# Patient Record
Sex: FEMALE | Race: WHITE | NOT HISPANIC OR LATINO | URBAN - METROPOLITAN AREA
[De-identification: names, ages, dates, MRNs, and addresses within clinical notes are randomized per-mention and may not be internally consistent; named-entity substitution may affect disease eponyms.]

---

## 2017-07-25 ENCOUNTER — DOCUMENTATION (OUTPATIENT)
Dept: URGENT CARE | Facility: PHYSICIAN GROUP | Age: 32
End: 2017-07-25

## 2019-03-05 ENCOUNTER — NON-PROVIDER VISIT (OUTPATIENT)
Dept: URGENT CARE | Facility: PHYSICIAN GROUP | Age: 34
End: 2019-03-05

## 2019-03-05 DIAGNOSIS — Z11.1 ENCOUNTER FOR PPD TEST: Primary | ICD-10-CM

## 2019-03-05 PROCEDURE — 86580 TB INTRADERMAL TEST: CPT | Performed by: PHYSICIAN ASSISTANT

## 2019-03-05 NOTE — PROGRESS NOTES
Svetlana Lunsford is a 33 y.o. female here for a non-provider visit for PPD placement -- Step 1 of 1    Reason for PPD:  work requirement    1. TB evaluation questionnaire completed by patient? Yes      -  If any answers marked yes did you contact a provider prior to placing? Not Indicated  2.  Patient notified to return to clinic for reading on: 3/7/2019  3.  PPD Placement documentation completed on TB evaluation questionnaire? Yes  4.  Location of TB evaluation questionnaire filed: front office

## 2019-03-07 ENCOUNTER — NON-PROVIDER VISIT (OUTPATIENT)
Dept: URGENT CARE | Facility: PHYSICIAN GROUP | Age: 34
End: 2019-03-07

## 2019-03-07 LAB — TB WHEAL 3D P 5 TU DIAM: 0 MM

## 2024-07-14 ENCOUNTER — OCCUPATIONAL MEDICINE (OUTPATIENT)
Dept: URGENT CARE | Facility: PHYSICIAN GROUP | Age: 39
End: 2024-07-14
Payer: COMMERCIAL

## 2024-07-14 ENCOUNTER — HOSPITAL ENCOUNTER (OUTPATIENT)
Dept: RADIOLOGY | Facility: MEDICAL CENTER | Age: 39
End: 2024-07-14
Attending: PHYSICIAN ASSISTANT

## 2024-07-14 VITALS
HEIGHT: 65 IN | OXYGEN SATURATION: 100 % | WEIGHT: 228.51 LBS | TEMPERATURE: 98 F | BODY MASS INDEX: 38.07 KG/M2 | SYSTOLIC BLOOD PRESSURE: 114 MMHG | DIASTOLIC BLOOD PRESSURE: 70 MMHG | HEART RATE: 85 BPM | RESPIRATION RATE: 15 BRPM
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
## 2024-07-14 DIAGNOSIS — S39.012A STRAIN OF LUMBAR PARASPINOUS MUSCLE, INITIAL ENCOUNTER: ICD-10-CM

## 2024-07-14 DIAGNOSIS — M54.16 LUMBAR RADICULOPATHY: ICD-10-CM

## 2024-07-14 PROCEDURE — 72100 X-RAY EXAM L-S SPINE 2/3 VWS: CPT

## 2024-07-14 PROCEDURE — 3078F DIAST BP <80 MM HG: CPT | Performed by: PHYSICIAN ASSISTANT

## 2024-07-14 PROCEDURE — 99203 OFFICE O/P NEW LOW 30 MIN: CPT | Performed by: PHYSICIAN ASSISTANT

## 2024-07-14 PROCEDURE — 3074F SYST BP LT 130 MM HG: CPT | Performed by: PHYSICIAN ASSISTANT

## 2024-07-14 RX ORDER — METHYLPREDNISOLONE 4 MG/1
4 TABLET ORAL DAILY
Qty: 21 TABLET | Refills: 0 | Status: SHIPPED | OUTPATIENT
Start: 2024-07-14

## 2024-07-14 RX ORDER — TIZANIDINE 4 MG/1
4 TABLET ORAL EVERY 6 HOURS PRN
Qty: 15 TABLET | Refills: 0 | Status: SHIPPED | OUTPATIENT
Start: 2024-07-14

## 2024-07-19 ENCOUNTER — OCCUPATIONAL MEDICINE (OUTPATIENT)
Dept: URGENT CARE | Facility: PHYSICIAN GROUP | Age: 39
End: 2024-07-19
Payer: COMMERCIAL

## 2024-07-19 VITALS
SYSTOLIC BLOOD PRESSURE: 112 MMHG | TEMPERATURE: 97.4 F | WEIGHT: 228 LBS | OXYGEN SATURATION: 98 % | HEART RATE: 78 BPM | RESPIRATION RATE: 16 BRPM | DIASTOLIC BLOOD PRESSURE: 80 MMHG | BODY MASS INDEX: 37.99 KG/M2 | HEIGHT: 65 IN

## 2024-07-19 DIAGNOSIS — S39.012D STRAIN OF LUMBAR PARASPINOUS MUSCLE, SUBSEQUENT ENCOUNTER: ICD-10-CM

## 2024-07-19 PROCEDURE — 3079F DIAST BP 80-89 MM HG: CPT | Performed by: PHYSICIAN ASSISTANT

## 2024-07-19 PROCEDURE — 3074F SYST BP LT 130 MM HG: CPT | Performed by: PHYSICIAN ASSISTANT

## 2024-07-19 PROCEDURE — 99214 OFFICE O/P EST MOD 30 MIN: CPT | Performed by: PHYSICIAN ASSISTANT

## 2024-07-26 ENCOUNTER — OCCUPATIONAL MEDICINE (OUTPATIENT)
Dept: URGENT CARE | Facility: PHYSICIAN GROUP | Age: 39
End: 2024-07-26
Payer: COMMERCIAL

## 2024-07-26 VITALS
BODY MASS INDEX: 37.32 KG/M2 | SYSTOLIC BLOOD PRESSURE: 120 MMHG | HEIGHT: 65 IN | RESPIRATION RATE: 17 BRPM | HEART RATE: 74 BPM | TEMPERATURE: 97.8 F | WEIGHT: 224 LBS | DIASTOLIC BLOOD PRESSURE: 74 MMHG | OXYGEN SATURATION: 99 %

## 2024-07-26 DIAGNOSIS — S39.012D STRAIN OF LUMBAR PARASPINOUS MUSCLE, SUBSEQUENT ENCOUNTER: ICD-10-CM

## 2024-07-26 PROCEDURE — 3074F SYST BP LT 130 MM HG: CPT | Performed by: PHYSICIAN ASSISTANT

## 2024-07-26 PROCEDURE — 3078F DIAST BP <80 MM HG: CPT | Performed by: PHYSICIAN ASSISTANT

## 2024-07-26 PROCEDURE — 99213 OFFICE O/P EST LOW 20 MIN: CPT | Performed by: PHYSICIAN ASSISTANT

## 2024-08-02 ENCOUNTER — OCCUPATIONAL MEDICINE (OUTPATIENT)
Dept: URGENT CARE | Facility: PHYSICIAN GROUP | Age: 39
End: 2024-08-02
Payer: COMMERCIAL

## 2024-08-02 VITALS
OXYGEN SATURATION: 97 % | TEMPERATURE: 97 F | SYSTOLIC BLOOD PRESSURE: 122 MMHG | HEIGHT: 65 IN | DIASTOLIC BLOOD PRESSURE: 84 MMHG | BODY MASS INDEX: 37.32 KG/M2 | RESPIRATION RATE: 16 BRPM | WEIGHT: 224 LBS | HEART RATE: 82 BPM

## 2024-08-02 DIAGNOSIS — S39.012D STRAIN OF LUMBAR PARASPINOUS MUSCLE, SUBSEQUENT ENCOUNTER: ICD-10-CM

## 2024-08-02 PROCEDURE — 99213 OFFICE O/P EST LOW 20 MIN: CPT | Performed by: NURSE PRACTITIONER

## 2024-08-02 PROCEDURE — 3079F DIAST BP 80-89 MM HG: CPT | Performed by: NURSE PRACTITIONER

## 2024-08-02 PROCEDURE — 3074F SYST BP LT 130 MM HG: CPT | Performed by: NURSE PRACTITIONER

## 2024-08-02 NOTE — PROGRESS NOTES
Date: 08/02/24        Chief Complaint   Patient presents with    Follow-Up     doi 07/01/24// lower back left side still hurts  R leg is better         History of Present Illness: 38 y.o.  female presents to clinic with for her fourth Workmen's Comp. follow-up.  Patient states she is significantly improved.  She is no longer having sciatic-like pain in her right leg.  She states she only continues to have a dull ache in the left lower back.  She states that her work has been working with her as far as her restrictions.  She states she has been able to perform full duty tasks but only for 3 to 4 hours at a time.  Then her work allows her to do light duty tasks.  She feels like she needs a little more time to recover.  She denies any red flag symptoms.  She does not feel that returning to full duty for 3 to 4 hours is hindering her recovery.  She has not more pain at night or the next day.      ROS:      As otherwise stated in HPI    Medical/SX/ Social History:  Reviewed per chart    Pertinent Medications:    Current Outpatient Medications on File Prior to Visit   Medication Sig Dispense Refill    methylPREDNISolone (MEDROL DOSEPAK) 4 MG Tablet Therapy Pack Take 1 Tablet by mouth every day. Follow schedule on package instructions. (Patient not taking: Reported on 7/26/2024) 21 Tablet 0    tizanidine (ZANAFLEX) 4 MG Tab Take 1 Tablet by mouth every 6 hours as needed (Muscle Spasm). (Patient not taking: Reported on 7/26/2024) 15 Tablet 0     No current facility-administered medications on file prior to visit.        Allergies:    Erythromycin     Problem list, medications, and allergies reviewed by myself today in Epic     Physical Exam:    Vitals:    08/02/24 0901   BP: 122/84   Pulse: 82   Resp: 16   Temp: 36.1 °C (97 °F)   SpO2: 97%             Physical Exam  Constitutional:       Appearance: Normal appearance.   Musculoskeletal:      Cervical back: Normal.      Thoracic back: Normal.      Lumbar back: Normal.         Back:       Comments: No midline bony tenderness crepitus or step-off.   Neurological:      Mental Status: She is alert.          Medical Decision making and plan :  I personally reviewed prior external notes and test results pertinent to today's visit. Pt is clinically stable at today's acute urgent care visit.  Patient appears nontoxic with no acute distress noted. Appropriate for outpatient care at this time.      Pleasant 38 y.o. female presented clinic with Workmen's Comp. injury.  Will return patient to restricted duty of no lifting of greater than 25 pounds.  Bending at the waist as tolerated.  Return to clinic in 9 days for follow-up anticipate discharge MMI at that time.  Next visit will be the patient's fifth visit although will have her return to do it anticipate discharge.    1. Strain of lumbar paraspinous muscle, subsequent encounter       Disposition:  Home in stable condition       Voice Recognition Disclaimer:  Portions of this document were created using voice recognition software. The software does have a chance of producing errors of grammar and possibly content. I have made every reasonable attempt to correct obvious errors, but there may be errors of grammar and possibly content that I did not discover before finalizing the documentation.    LAISHA Rubin.

## 2024-08-02 NOTE — LETTER
PHYSICIAN’S AND CHIROPRACTIC PHYSICIAN'S   PROGRESS REPORT CERTIFICATION OF DISABILITY Claim Number:     Social Security Number:    Patient’s Name:Svetlana Lunsford Date of Injury: 7/1/2024   Employer:    [employer] Name of O (if applicable)      Patient’s Job Description/Occupation: Retail- Asst       Previous Injuries/Diseases/Surgeries Contributing to the Condition:  None         Diagnosis:    1. Strain of lumbar paraspinous muscle, subsequent encounter                        Related to the Industrial Injury? Yes     Explain:Happened at work         Objective Medical Findings:No midline bony tenderness crepitus or step-off of the CT or L-spine.  No musculoskeletal spasms palpated.  Mild a dull ache to the left lateral side consistent with musculoskeletal etiology.        None - Discharged                         Stable  Yes                 Ratable  Yes     X  Generally Improved                        Condition Worsened                 Condition Same  May Have Suffered a Permanent Disability  No     Treatment Plan:             No Change in Therapy                 PT/OT Prescribed                     Medication May be Used While Working       Case Management                        PT/OT Discontinued    Consultation    Further Diagnostic Studies:                               Prescription(s)                             Released to FULL DUTY /No Restrictions on (Date):  From:      Certified TOTALLY TEMPORARILY DISABLED (Indicate Dates) From:   To:    X  Released to RES TRICTED/Modified Duty on (Date): From: 8/2/2024 To: 8/11/2024                                                               Restrictions Are:  Temporary     No Sitting                               No Standing                   No Pulling                  Other: May bend at the waist as tolerated    No Bending at Waist           No Stooping                  X  No Lifting       No Carrying                           No Walking                 Lifting Restricted to (lbs.):  < or = to 25 pounds    No Pushing                            No Climbing                   No Reaching Above Shoulders   Date of Next Visit:  8/11/2024    9 AM  Date of this Exam:8/2/2024 Physician/Chiropractic Physician Name:REMY [email or web address].[email or web address]. Physician/Chiropractic Physician Signature:  Juan M Lindsey DO MPH   D-39 (Rev. 2/24)

## 2024-08-11 ENCOUNTER — OCCUPATIONAL MEDICINE (OUTPATIENT)
Dept: URGENT CARE | Facility: PHYSICIAN GROUP | Age: 39
End: 2024-08-11
Payer: COMMERCIAL

## 2024-08-11 VITALS
WEIGHT: 223.99 LBS | RESPIRATION RATE: 18 BRPM | DIASTOLIC BLOOD PRESSURE: 76 MMHG | HEART RATE: 78 BPM | SYSTOLIC BLOOD PRESSURE: 102 MMHG | OXYGEN SATURATION: 97 % | HEIGHT: 65 IN | TEMPERATURE: 97.2 F | BODY MASS INDEX: 37.32 KG/M2

## 2024-08-11 DIAGNOSIS — S39.012D STRAIN OF LUMBAR PARASPINOUS MUSCLE, SUBSEQUENT ENCOUNTER: ICD-10-CM
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
## 2024-08-11 PROCEDURE — 3074F SYST BP LT 130 MM HG: CPT | Performed by: PHYSICIAN ASSISTANT

## 2024-08-11 PROCEDURE — 99214 OFFICE O/P EST MOD 30 MIN: CPT | Performed by: PHYSICIAN ASSISTANT

## 2024-08-11 PROCEDURE — 3078F DIAST BP <80 MM HG: CPT | Performed by: PHYSICIAN ASSISTANT

## 2024-08-11 NOTE — PROGRESS NOTES
"Subjective:     Svetlana Lunsford is a 39 y.o. female who presents for Low Back Pain (Worker's comp fv. Pt reports it's gotten better and pain has completely resolved. 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  No injury since last office visit.    PMH:   No pertinent past medical history to this problem  MEDS:  Medications were reviewed in EMR  ALLERGIES:  Allergies were reviewed in EMR  SOCHX:  Works as a   FH:   No pertinent family history to this problem       Objective:     /76 (BP Location: Left arm, Patient Position: Sitting, BP Cuff Size: Adult)   Pulse 78   Temp 36.2 °C (97.2 °F) (Temporal)   Resp 18   Ht 1.651 m (5' 5\") Comment: Pt reported  Wt 102 kg (223 lb 15.8 oz)   SpO2 97%   BMI 37.27 kg/m²     No tenderness palpation on exam.      Diagnostic:  None    Assessment/Plan:     Encounter Diagnoses   Name Primary?    Strain of lumbar paraspinous muscle, subsequent encounter     Work related injury          Plan:  Discharge MMI at this time.  Office visit and x-ray from 7/14 were reviewed, also reviewed visit from 7/19, 7/26 and 8/2.    Differential diagnosis, natural history, supportive care, and indications for immediate follow-up discussed.    Bo[place]ale Ortiz PA-C    "

## 2024-08-11 NOTE — LETTER
PHYSICIAN’S AND CHIROPRACTIC PHYSICIAN'S   PROGRESS REPORT CERTIFICATION OF DISABILITY Claim Number:     Social Security Number:    Patient’s Name:Svetlana Lunsford Date of Injury:7/1/2024   Employer:  [employer] Name of [employer] (if applicable)      Patient’s Job Description/Occupation: Retail- Asst       Previous Injuries/Diseases/Surgeries Contributing to the Condition:         Diagnosis:  (S39.012D) Strain of lumbar paraspinous muscle, subsequent encounter  (Y99.0) Work related injury      Related to the Industrial Injury? 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  No injury since last office visit.      Objective Medical Findings:No tenderness palpation on exam.      X  None - Discharged                         Stable  No                 Ratable  No       Generally Improved                        Condition Worsened                 Condition Same  May Have Suffered a Permanent Disability  No     Treatment Plan:    Discharge MMI        No Change in Therapy                 PT/OT Prescribed                     Medication May be Used While Working       Case Management                        PT/OT Discontinued    Consultation    Further Diagnostic Studies:                               Prescription(s)                           X  Released to FULL DUTY /No Restrictions on (Date):  From: 8/11/2024    Certified TOTALLY TEMPORARILY DISABLED (Indicate Dates) From:   To:      Released to RESTRICTED/Modified Duty on (Date): From:   To:                                                                 Restrictions Are:  Permanent     No Sitting                               No Standing                   No Pulling                  Other:      No Bending at Waist           No Stooping                    No Lifting       No  Carrying                           No Walking                Lifting Restricted to (lbs.):       No Pushing                            No Climbing                   No Reaching Above Shoulders   Date of Next Visit:    Date of this Exam:8/11/2024 Physician/Chiropractic Physician Name:Bryan Ortiz P.A.-C. Physician/Chiropractic Physician Signature:  Juan M iLndsey DO MPH   D-39 (Rev. 2/24)